# Patient Record
Sex: FEMALE | Race: WHITE | URBAN - METROPOLITAN AREA
[De-identification: names, ages, dates, MRNs, and addresses within clinical notes are randomized per-mention and may not be internally consistent; named-entity substitution may affect disease eponyms.]

---

## 2017-08-07 ENCOUNTER — HISTORICAL (OUTPATIENT)
Dept: ADMINISTRATIVE | Facility: HOSPITAL | Age: 42
End: 2017-08-07

## 2017-08-08 ENCOUNTER — HISTORICAL (OUTPATIENT)
Dept: LAB | Facility: HOSPITAL | Age: 42
End: 2017-08-08

## 2017-08-09 LAB — GRAM STN SPEC: NORMAL

## 2017-08-10 LAB — FINAL CULTURE: NORMAL

## 2017-09-05 LAB — FINAL CULTURE: NORMAL

## 2018-01-24 ENCOUNTER — HISTORICAL (OUTPATIENT)
Dept: LAB | Facility: HOSPITAL | Age: 43
End: 2018-01-24

## 2018-01-24 LAB
FLUAV AG NPH QL IA: POSITIVE
FLUBV AG NPH QL IA: NEGATIVE

## 2018-01-26 LAB
FINAL CULTURE: NORMAL
RAPID GROUP A STREP (OHS): NEGATIVE

## 2019-03-20 LAB — RAPID GROUP A STREP (OHS): NEGATIVE

## 2019-04-27 LAB
INFLUENZA A ANTIGEN, POC: NEGATIVE
INFLUENZA B ANTIGEN, POC: NEGATIVE
RAPID GROUP A STREP (OHS): NEGATIVE

## 2020-05-28 LAB — RAPID GROUP A STREP (OHS): POSITIVE

## 2020-08-17 ENCOUNTER — HISTORICAL (OUTPATIENT)
Dept: ADMINISTRATIVE | Facility: HOSPITAL | Age: 45
End: 2020-08-17

## 2020-08-17 LAB
ABS NEUT (OLG): 3.77 X10(3)/MCL (ref 2.1–9.2)
ALBUMIN SERPL-MCNC: 3.7 GM/DL (ref 3.5–5)
ALBUMIN/GLOB SERPL: 1.5 RATIO (ref 1.1–2)
ALP SERPL-CCNC: 58 UNIT/L (ref 40–150)
ALT SERPL-CCNC: 21 UNIT/L (ref 0–55)
AST SERPL-CCNC: 16 UNIT/L (ref 5–34)
BASOPHILS # BLD AUTO: 0.1 X10(3)/MCL (ref 0–0.2)
BASOPHILS NFR BLD AUTO: 1 %
BILIRUB SERPL-MCNC: 0.8 MG/DL
BILIRUBIN DIRECT+TOT PNL SERPL-MCNC: 0.4 MG/DL (ref 0–0.5)
BILIRUBIN DIRECT+TOT PNL SERPL-MCNC: 0.4 MG/DL (ref 0–0.8)
BUN SERPL-MCNC: 6.8 MG/DL (ref 7–18.7)
CALCIUM SERPL-MCNC: 8.9 MG/DL (ref 8.4–10.2)
CHLORIDE SERPL-SCNC: 104 MMOL/L (ref 98–107)
CO2 SERPL-SCNC: 28 MMOL/L (ref 22–29)
CREAT SERPL-MCNC: 0.77 MG/DL (ref 0.55–1.02)
EOSINOPHIL # BLD AUTO: 0.2 X10(3)/MCL (ref 0–0.9)
EOSINOPHIL NFR BLD AUTO: 4 %
ERYTHROCYTE [DISTWIDTH] IN BLOOD BY AUTOMATED COUNT: 11.9 % (ref 11.5–17)
ERYTHROCYTE [SEDIMENTATION RATE] IN BLOOD: 4 MM/HR (ref 0–20)
GLOBULIN SER-MCNC: 2.4 GM/DL (ref 2.4–3.5)
GLUCOSE SERPL-MCNC: 87 MG/DL (ref 74–100)
HCT VFR BLD AUTO: 44.8 % (ref 37–47)
HGB BLD-MCNC: 14.9 GM/DL (ref 12–16)
LYMPHOCYTES # BLD AUTO: 1.4 X10(3)/MCL (ref 0.6–4.6)
LYMPHOCYTES NFR BLD AUTO: 22 %
MCH RBC QN AUTO: 28.9 PG (ref 27–31)
MCHC RBC AUTO-ENTMCNC: 33.3 GM/DL (ref 33–36)
MCV RBC AUTO: 86.8 FL (ref 80–94)
MONOCYTES # BLD AUTO: 0.8 X10(3)/MCL (ref 0.1–1.3)
MONOCYTES NFR BLD AUTO: 12 %
NEUTROPHILS # BLD AUTO: 3.77 X10(3)/MCL (ref 2.1–9.2)
NEUTROPHILS NFR BLD AUTO: 61 %
PLATELET # BLD AUTO: 301 X10(3)/MCL (ref 130–400)
PMV BLD AUTO: 9.3 FL (ref 9.4–12.4)
POTASSIUM SERPL-SCNC: 4.1 MMOL/L (ref 3.5–5.1)
PROT SERPL-MCNC: 6.1 GM/DL (ref 6.4–8.3)
RBC # BLD AUTO: 5.16 X10(6)/MCL (ref 4.2–5.4)
SODIUM SERPL-SCNC: 138 MMOL/L (ref 136–145)
WBC # SPEC AUTO: 6.2 X10(3)/MCL (ref 4.5–11.5)

## 2020-09-15 ENCOUNTER — HISTORICAL (OUTPATIENT)
Dept: ADMINISTRATIVE | Facility: HOSPITAL | Age: 45
End: 2020-09-15

## 2020-09-15 LAB
ABS NEUT (OLG): 3.78 X10(3)/MCL (ref 2.1–9.2)
B-HCG FREE SERPL-ACNC: 3441.69 MIU/ML
BASOPHILS # BLD AUTO: 0.1 X10(3)/MCL (ref 0–0.2)
BASOPHILS NFR BLD AUTO: 1 %
EOSINOPHIL # BLD AUTO: 0.2 X10(3)/MCL (ref 0–0.9)
EOSINOPHIL NFR BLD AUTO: 3 %
ERYTHROCYTE [DISTWIDTH] IN BLOOD BY AUTOMATED COUNT: 13.2 % (ref 11.5–17)
GROUP & RH: NORMAL
HBV SURFACE AG SERPL QL IA: NONREACTIVE
HCT VFR BLD AUTO: 45.4 % (ref 37–47)
HGB BLD-MCNC: 15.1 GM/DL (ref 12–16)
HIV 1+2 AB+HIV1 P24 AG SERPL QL IA: NONREACTIVE
LYMPHOCYTES # BLD AUTO: 1.4 X10(3)/MCL (ref 0.6–4.6)
LYMPHOCYTES NFR BLD AUTO: 22 %
MCH RBC QN AUTO: 28.9 PG (ref 27–31)
MCHC RBC AUTO-ENTMCNC: 33.3 GM/DL (ref 33–36)
MCV RBC AUTO: 87 FL (ref 80–94)
MONOCYTES # BLD AUTO: 0.8 X10(3)/MCL (ref 0.1–1.3)
MONOCYTES NFR BLD AUTO: 12 %
NEUTROPHILS # BLD AUTO: 3.78 X10(3)/MCL (ref 2.1–9.2)
NEUTROPHILS NFR BLD AUTO: 60 %
PLATELET # BLD AUTO: 302 X10(3)/MCL (ref 130–400)
PMV BLD AUTO: 9.6 FL (ref 9.4–12.4)
PROGEST SERPL-MCNC: 8.7 NG/ML
RBC # BLD AUTO: 5.22 X10(6)/MCL (ref 4.2–5.4)
T PALLIDUM AB SER QL: NONREACTIVE
T3RU NFR SERPL: 34.87 % (ref 31–39)
T4 FREE SERPL-MCNC: 1.77 NG/DL (ref 0.7–1.48)
T4 SERPL-MCNC: 14.29 UG/DL (ref 4.87–11.72)
TSH SERPL-ACNC: <0.0083 UIU/ML (ref 0.35–4.94)
WBC # SPEC AUTO: 6.3 X10(3)/MCL (ref 4.5–11.5)

## 2020-09-17 LAB — FINAL CULTURE: NO GROWTH

## 2020-10-15 ENCOUNTER — HISTORICAL (OUTPATIENT)
Dept: ADMINISTRATIVE | Facility: HOSPITAL | Age: 45
End: 2020-10-15

## 2020-10-15 LAB
T3RU NFR SERPL: 31.85 % (ref 31–39)
T4 FREE SERPL-MCNC: 1.52 NG/DL (ref 0.7–1.48)
T4 SERPL-MCNC: 15.1 UG/DL (ref 4.87–11.72)
TSH SERPL-ACNC: <0.0083 UIU/ML (ref 0.35–4.94)

## 2020-10-26 ENCOUNTER — HISTORICAL (OUTPATIENT)
Dept: ADMINISTRATIVE | Facility: HOSPITAL | Age: 45
End: 2020-10-26

## 2020-10-29 LAB — FINAL CULTURE: NORMAL

## 2022-04-10 ENCOUNTER — HISTORICAL (OUTPATIENT)
Dept: ADMINISTRATIVE | Facility: HOSPITAL | Age: 47
End: 2022-04-10

## 2022-04-29 VITALS
SYSTOLIC BLOOD PRESSURE: 100 MMHG | SYSTOLIC BLOOD PRESSURE: 108 MMHG | BODY MASS INDEX: 29.71 KG/M2 | HEIGHT: 68 IN | DIASTOLIC BLOOD PRESSURE: 68 MMHG | OXYGEN SATURATION: 99 % | WEIGHT: 176.38 LBS | BODY MASS INDEX: 26.73 KG/M2 | HEIGHT: 69 IN | OXYGEN SATURATION: 100 % | WEIGHT: 200.63 LBS | DIASTOLIC BLOOD PRESSURE: 78 MMHG

## 2022-05-02 NOTE — HISTORICAL [facility] CERNER
This is a historical note converted from Kael. Formatting and pictures may have been removed.  Please reference Kael for original formatting and attached multimedia. Chief Complaint  cough off and on two months with post nasal drip, worse past 3 days.  History of Present Illness  A 1-year-old female presents for a dry cough off and on for 2 months but has gotten progressively worse over the past 3 days.? She has a history of sinus issues and had surgery last year. ?She saw her ENT a few weeks ago and was put on a 2 week course of clindamycin?and steroids that she just finished 3 days ago. ?Since stopping the medications patients cough?is constant and she is starting to get chest pain from coughing so much.? She also reports bilateral maxillary sinus pressure.? She has not taken?any antihistamines?and denies a history of seasonal allergies.? Reports subjective fevers and chills at night but is fine during the day. ?She is not a smoker and never has been. ?Denies shortness of breath and wheezing.  Review of Systems  General:?+ f/c, night sweats, decreased appetite  Eye: denies blurred vision, changes in vision, vision loss.  Respiratory: denies sob, wheezing, orthopnea. + constant dry cough  Oropharynx: denies sore throat. + postnasal drip  Cardiovascular: denies chest pain, palpitations, edema  Gastrointestinal: denies abdominal pain, melena, hematochezia, constipation, diarrhea  Integumentary: denies rashes, pruritis  Physical Exam  Vitals & Measurements  T:?37? ?C ?(Oral)? HR:?83?(Peripheral)? BP:?108/78? SpO2:?99%?  HT:?175?cm? HT:?175?cm? WT:?91?kg? WT:?91?kg? BMI:?29.71?  Constitutional: NAD, alert, pleasant  Respiratory: CTAB, no wheezes, rales or rhonchi. No accessory muscle use  Eyes: EOMI, bilateral watering of eyes  Ears: bilateral mild erythema of canals. TM+ light reflex without erythema or bulging  Oropharynx: + postnasal drip. NO purulence or tonsillary erythema  Cardiovascular: RRR, No m/r/g. No  JVD. No LE edema  Gastrointestinal: BS+, nontender, nondistended  Integumentary: warm, dry, intact  Psych: AA&Ox3  All Other ROS: negative  Assessment/Plan  1.?Acute bronchitis  ?- drink plenty of water and take mucinex over the counter  - also take allegra over the counter. If you cant afford ala-hist, take allegra D  - cough syrup may make you drowsy  - go to ED if you have shortness of breath  - keep appt with ENT this week  - use nasal saline rinses twice a day with flonase or nasonex  ?  Orders:  azithromycin, = 1 packet(s), Oral, As Directed, per package labeling, X 5 day(s), # 6 tab(s), 0 Refill(s), Pharmacy: [id number]  chlorpheniramine-hydrocodone, 5 mL, Oral, q12hr, PRN PRN as needed for cold symptoms, X 3 day(s), # 120 mL, 0 Refill(s), Pharmacy: [id number]  dexbrompheniramine-phenylephrine, 1 tab(s), Oral, q6hr, X 10 day(s), # 40 tab(s), 0 Refill(s), Pharmacy: [id number]   Problem List/Past Medical History  ADD (attention deficit disorder)  Obesity  Historical  No historical problems  Procedure/Surgical History  c section, lap-band, sinus surgery.  Medications  Ala Hist PE 2 mg-10 mg oral tablet, 1 tab(s), Oral, q6hr  dexmethylphenidate 10 mg oral tablet  Tussionex [employer] 10 mg-8 mg/5 mL oral suspension, extended release, 5 mL, Oral, q12hr, PRN  VALACYCLOVIR 500MG TABLET  NOHELIA TABLET, 1 tab(s), Oral, Daily  Zithromax Z-Joselito 250 mg oral tablet, 1 packet(s), Oral, As Directed  Allergies  No Known Medication Allergies  Social History  Tobacco  Never smoker  Family History  Heart disease: Mother.

## 2022-09-15 ENCOUNTER — HISTORICAL (OUTPATIENT)
Dept: ADMINISTRATIVE | Facility: HOSPITAL | Age: 47
End: 2022-09-15